# Patient Record
Sex: MALE | URBAN - METROPOLITAN AREA
[De-identification: names, ages, dates, MRNs, and addresses within clinical notes are randomized per-mention and may not be internally consistent; named-entity substitution may affect disease eponyms.]

---

## 2017-03-28 ENCOUNTER — TELEPHONE (OUTPATIENT)
Dept: UROLOGY | Facility: CLINIC | Age: 72
End: 2017-03-28

## 2017-03-28 NOTE — TELEPHONE ENCOUNTER
Patient has history of urinary retention and will need to self cath every 4 hours daily  6 times per day referenced to his may 4 2016 visit with MD. Abby Hawley LPN Staff Nurse

## 2017-03-28 NOTE — TELEPHONE ENCOUNTER
Patient has history of urinary retention and will need to cath selfs every 4 hours  6 times per day. Abby Hawley LPN Staff Nurse